# Patient Record
Sex: FEMALE | Race: WHITE | NOT HISPANIC OR LATINO | Employment: PART TIME | URBAN - METROPOLITAN AREA
[De-identification: names, ages, dates, MRNs, and addresses within clinical notes are randomized per-mention and may not be internally consistent; named-entity substitution may affect disease eponyms.]

---

## 2018-06-13 ENCOUNTER — OFFICE VISIT (OUTPATIENT)
Dept: NEUROSURGERY | Facility: CLINIC | Age: 28
End: 2018-06-13

## 2018-06-13 VITALS — OXYGEN SATURATION: 99 % | HEIGHT: 65 IN | RESPIRATION RATE: 18 BRPM | BODY MASS INDEX: 48.15 KG/M2 | WEIGHT: 289 LBS

## 2018-06-13 DIAGNOSIS — S39.012A STRAIN OF LUMBAR REGION, INITIAL ENCOUNTER: Primary | ICD-10-CM

## 2018-06-13 DIAGNOSIS — M51.36 DISC DEGENERATION, LUMBAR: ICD-10-CM

## 2018-06-13 DIAGNOSIS — Z71.6 ENCOUNTER FOR SMOKING CESSATION COUNSELING: ICD-10-CM

## 2018-06-13 DIAGNOSIS — M54.50 ACUTE BILATERAL LOW BACK PAIN WITHOUT SCIATICA: ICD-10-CM

## 2018-06-13 PROCEDURE — 99203 OFFICE O/P NEW LOW 30 MIN: CPT | Performed by: PHYSICIAN ASSISTANT

## 2018-06-13 PROCEDURE — 99406 BEHAV CHNG SMOKING 3-10 MIN: CPT | Performed by: PHYSICIAN ASSISTANT

## 2018-06-13 RX ORDER — BACLOFEN 10 MG/1
10 TABLET ORAL 2 TIMES DAILY
Refills: 0 | COMMUNITY
Start: 2018-04-04 | End: 2018-06-13

## 2018-06-13 RX ORDER — LORAZEPAM 1 MG/1
1 TABLET ORAL DAILY
Refills: 0 | COMMUNITY
Start: 2018-04-04

## 2018-06-13 RX ORDER — METHYLPREDNISOLONE 4 MG/1
TABLET ORAL
Refills: 0 | COMMUNITY
Start: 2018-06-01 | End: 2018-06-13

## 2018-06-13 RX ORDER — TRAMADOL HYDROCHLORIDE 50 MG/1
TABLET ORAL
Refills: 0 | COMMUNITY
Start: 2018-06-01 | End: 2019-01-31

## 2018-06-13 RX ORDER — PROMETHAZINE HYDROCHLORIDE 12.5 MG/1
TABLET ORAL
Refills: 0 | COMMUNITY
Start: 2018-03-15 | End: 2018-06-13

## 2018-06-13 RX ORDER — AMITRIPTYLINE HYDROCHLORIDE 50 MG/1
50 TABLET, FILM COATED ORAL
Refills: 0 | COMMUNITY
Start: 2018-06-05 | End: 2018-06-13

## 2018-06-13 RX ORDER — IBUPROFEN 800 MG/1
800 TABLET ORAL EVERY 6 HOURS
Refills: 0 | COMMUNITY
Start: 2018-03-15 | End: 2018-06-13

## 2018-06-13 RX ORDER — CODEINE PHOSPHATE AND GUAIFENESIN 10; 100 MG/5ML; MG/5ML
SOLUTION ORAL
Refills: 0 | COMMUNITY
Start: 2018-03-15 | End: 2018-06-13

## 2018-06-13 RX ORDER — MELOXICAM 7.5 MG/1
7.5 TABLET ORAL 2 TIMES DAILY
Qty: 60 TABLET | Refills: 0 | Status: SHIPPED | OUTPATIENT
Start: 2018-06-13 | End: 2018-07-23 | Stop reason: SDUPTHER

## 2018-06-13 RX ORDER — MELOXICAM 15 MG/1
15 TABLET ORAL DAILY
Refills: 0 | COMMUNITY
Start: 2018-06-01 | End: 2018-07-23 | Stop reason: SDUPTHER

## 2018-06-13 NOTE — PROGRESS NOTES
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  She fell from standing and landed on her back about 2 weeks ago.  She complains of pain in the low back. After walking for extended periods she has notices some discomfort down the anterior aspect of the right leg to the calf.  Largely her pain is axial and has improved slowly everyday since her fall. She She was evaluated in the ED after her fall and a CT was done which reportedly showed a \"bulging disc\".  She has a history of some intermittent low back pain.  No bowel or bladder issues. No weakness.     Past Medical History:   Diagnosis Date   • Arthritis    • Headache    • Low back pain        Past Surgical History:   Procedure Laterality Date   •  SECTION     • CHOLECYSTECTOMY  2015       Social History     Social History   • Marital status: Legally      Spouse name: N/A   • Number of children: N/A   • Years of education: N/A     Occupational History   • Not on file.     Social History Main Topics   • Smoking status: Current Every Day Smoker   • Smokeless tobacco: Never Used   • Alcohol use Yes   • Drug use: No   • Sexual activity: Defer     Other Topics Concern   • Not on file     Social History Narrative   • No narrative on file         Current Outpatient Prescriptions:   •  LORazepam (ATIVAN) 1 MG tablet, Take 1 mg by mouth Daily., Disp: , Rfl: 0  •  meloxicam (MOBIC) 15 MG tablet, Take 15 mg by mouth Daily., Disp: , Rfl: 0  •  meloxicam (MOBIC) 7.5 MG tablet, Take 1 tablet by mouth 2 (Two) Times a Day., Disp: 60 tablet, Rfl: 0  •  traMADol (ULTRAM) 50 MG tablet, TAKE 1 TABLET BY MOUTH 3 TO 4 TIMES A DAY AS NEEDED, Disp: , Rfl: 0    I discussed >3m the need to STOP smoking, there is a direct correlation between smoking and wound healing and degenerative disc " disease. Patient will take this under advisement and discuss with their primary provider.      Review of Systems   Constitutional: Negative for activity change, appetite change, chills, diaphoresis, fatigue, fever and unexpected weight change.   HENT: Negative for congestion, dental problem, drooling, ear discharge, ear pain, facial swelling, hearing loss, mouth sores, nosebleeds, postnasal drip, rhinorrhea, sinus pressure, sneezing, sore throat, tinnitus, trouble swallowing and voice change.    Eyes: Negative for photophobia, pain, discharge, redness, itching and visual disturbance.   Respiratory: Negative for apnea, cough, choking, chest tightness, shortness of breath, wheezing and stridor.    Cardiovascular: Negative for chest pain, palpitations and leg swelling.   Gastrointestinal: Negative for abdominal distention, abdominal pain, anal bleeding, blood in stool, constipation, diarrhea, nausea, rectal pain and vomiting.   Endocrine: Negative for cold intolerance, heat intolerance, polydipsia, polyphagia and polyuria.   Genitourinary: Negative for decreased urine volume, difficulty urinating, dysuria, enuresis, flank pain, frequency, genital sores, hematuria and urgency.   Musculoskeletal: Positive for back pain. Negative for arthralgias, gait problem, joint swelling, myalgias, neck pain and neck stiffness.   Skin: Negative for color change, pallor, rash and wound.   Allergic/Immunologic: Negative for environmental allergies, food allergies and immunocompromised state.   Neurological: Negative for dizziness, tremors, seizures, syncope, facial asymmetry, speech difficulty, weakness, light-headedness, numbness and headaches.   Hematological: Negative for adenopathy. Does not bruise/bleed easily.   Psychiatric/Behavioral: Negative for agitation, behavioral problems, confusion, decreased concentration, dysphoric mood, hallucinations, self-injury, sleep disturbance and suicidal ideas. The patient is not nervous/anxious  "and is not hyperactive.    All other systems reviewed and are negative.      Objective   Vital Signs: Resp. rate 18, height 165.1 cm (65\"), weight 131 kg (289 lb), SpO2 99 %.  Physical Exam   Constitutional: She appears well-developed and well-nourished. No distress.   HENT:   Head: Normocephalic and atraumatic.   Eyes: EOM are normal. Pupils are equal, round, and reactive to light.   Pulmonary/Chest: Effort normal and breath sounds normal.   Skin: Skin is warm and dry.   Psychiatric: She has a normal mood and affect. Her behavior is normal. Thought content normal.   Nursing note and vitals reviewed.  Musculoskeletal:  Strength is intact in upper and lower extremities to direct testing.  Station and gait are normal.  Straight leg raise elicits a stretching pain behind the knee.   Neurologic:  Muscle tone is normal throughout.  Coordination is intact.  Deep tendon reflexes: 1+ and symmetrical.  Sensation is intact to light touch throughout.  Patient is oriented to person, place, and time.    Independent review of radiographic imaging: Ct of the lumbar spine demonstrates a central disc bulge at L4-5.     Assessment/Plan    Diagnosis: lumbar strain     Medical Decision Making: I recommended physical therapy and continue Mobic. I also counseled her on weight loss and smoking cessation for her chronic intermittent low back issues.  She will follow up with me after her therapy in 6 weeks. If she has any issue in the interim I asked her to call our office.         Preeti was seen today for back pain.    Diagnoses and all orders for this visit:    Disc degeneration, lumbar  -     Ambulatory Referral to Physical Therapy Evaluate and treat    Strain of lumbar region, initial encounter  -     Ambulatory Referral to Physical Therapy Evaluate and treat    Acute bilateral low back pain without sciatica  -     Ambulatory Referral to Physical Therapy Evaluate and treat    Other orders  -     meloxicam (MOBIC) 7.5 MG tablet; Take 1 " tablet by mouth 2 (Two) Times a Day.                 Patient Care Team:  AMANUEL Israel as PCP - General (Nurse Practitioner)  AMANUEL Israel as Referring Physician (Nurse Practitioner)

## 2018-07-23 RX ORDER — MELOXICAM 7.5 MG/1
TABLET ORAL
Qty: 60 TABLET | Refills: 3 | Status: SHIPPED | OUTPATIENT
Start: 2018-07-23 | End: 2019-01-31

## 2018-10-01 ENCOUNTER — TELEPHONE (OUTPATIENT)
Dept: NEUROSURGERY | Facility: CLINIC | Age: 28
End: 2018-10-01

## 2018-10-01 DIAGNOSIS — G89.29 CHRONIC BILATERAL LOW BACK PAIN, WITH SCIATICA PRESENCE UNSPECIFIED: Primary | ICD-10-CM

## 2018-10-01 DIAGNOSIS — M54.5 CHRONIC BILATERAL LOW BACK PAIN, WITH SCIATICA PRESENCE UNSPECIFIED: Primary | ICD-10-CM

## 2018-10-01 PROBLEM — M54.50 CHRONIC BILATERAL LOW BACK PAIN: Status: ACTIVE | Noted: 2018-10-01

## 2018-10-01 NOTE — TELEPHONE ENCOUNTER
Provider: Haydee    Caller: self  Time of call: 11:06    Phone #: [phone]   Surgery: n/a    Last visit:  6/13/18   Next visit: after therapy    ANTONIA:         Reason for call:         Patient called to get a new PT order. It has been to long since she was seen and the PT place needs a new order.    I have pend the order.

## 2019-01-31 ENCOUNTER — OFFICE VISIT (OUTPATIENT)
Dept: NEUROSURGERY | Facility: CLINIC | Age: 29
End: 2019-01-31

## 2019-01-31 VITALS
HEIGHT: 65 IN | TEMPERATURE: 97.6 F | BODY MASS INDEX: 45.18 KG/M2 | SYSTOLIC BLOOD PRESSURE: 132 MMHG | WEIGHT: 271.2 LBS | DIASTOLIC BLOOD PRESSURE: 88 MMHG

## 2019-01-31 DIAGNOSIS — M54.41 CHRONIC BILATERAL LOW BACK PAIN WITH RIGHT-SIDED SCIATICA: Primary | ICD-10-CM

## 2019-01-31 DIAGNOSIS — G89.29 CHRONIC BILATERAL LOW BACK PAIN WITH RIGHT-SIDED SCIATICA: Primary | ICD-10-CM

## 2019-01-31 PROCEDURE — 99214 OFFICE O/P EST MOD 30 MIN: CPT | Performed by: PHYSICIAN ASSISTANT

## 2019-01-31 RX ORDER — TIZANIDINE 4 MG/1
4 TABLET ORAL 3 TIMES DAILY
Refills: 0 | COMMUNITY
Start: 2019-01-18

## 2019-01-31 RX ORDER — LISINOPRIL 20 MG/1
TABLET ORAL
COMMUNITY
Start: 2019-01-22

## 2019-01-31 RX ORDER — OXYCODONE HYDROCHLORIDE AND ACETAMINOPHEN 5; 325 MG/1; MG/1
TABLET ORAL
COMMUNITY
Start: 2019-01-29

## 2019-01-31 NOTE — PROGRESS NOTES
"Patient: Preeti Tejada  : 1990  Gender: female    Primary Care Provider: Sudha Mari APRN      Chief Complaint:   Chief Complaint   Patient presents with   • Follow-up     after physical therapy       History of Present Illness:  Mrs. Tejada is a 28-year-old female who works as a like  who presents today for a follow-up for low back pain. She was initially evaluated in our office in 2018 following an ED visit where CT demonstrated a \"bulging disc\". She has not had any additional imaging to date.  We referred her to physical therapy at this time and today she is following up due to ongoing pain.  Patient states that she has low back pain that is constant. She feels as if her back \"locks up\" when she tries to go from sitting to standing. This is also associated with right leg pain and weakness. She states that she walks with a limp due to this. She also admits to numbness that occurs intermittently in her entire right leg but spares the toes. She denies any bowel or bladder dysfunction in association. For several months she has attended PT regularly as well as going for chiropractic adjustments when needed, with only minimal relief.  She has also taken Mobic and multiple Medrol Dosepaks from her PCP, which give only short term relief.       Past Medical and Surgical History:  Past Medical History:   Diagnosis Date   • Arthritis    • Headache    • Low back pain      Past Surgical History:   Procedure Laterality Date   •  SECTION     • CHOLECYSTECTOMY  2015   • DILATATION AND CURETTAGE  2019    [place], [place]   • TUBAL ABDOMINAL LIGATION  2019    [facility]       Current Medications:    Current Outpatient Medications:   •  lisinopril (PRINIVIL,ZESTRIL) 20 MG tablet, , Disp: , Rfl:   •  LORazepam (ATIVAN) 1 MG tablet, Take 1 mg by mouth Daily., Disp: , Rfl: 0  •  norethindrone (AYGESTIN) 5 MG tablet, , Disp: , Rfl:   •  " "oxyCODONE-acetaminophen (PERCOCET) 5-325 MG per tablet, , Disp: , Rfl:   •  tiZANidine (ZANAFLEX) 4 MG tablet, Take 4 mg by mouth 3 (Three) Times a Day., Disp: , Rfl: 0    Allergies:  Allergies   Allergen Reactions   • Topamax [Topiramate] Shortness Of Breath and Rash         Review of Systems   HENT: Negative.    Eyes: Negative.    Respiratory: Negative.    Cardiovascular: Negative.    Gastrointestinal: Negative.    Endocrine: Negative.    Genitourinary: Negative.    Musculoskeletal: Positive for back pain.        RLE pain   Skin: Negative.    Allergic/Immunologic: Negative.    Neurological: Positive for weakness and numbness (RLE).   Hematological: Negative.    Psychiatric/Behavioral: Negative.    All other systems reviewed and are negative.        Physical Exam  NEUROLOGICAL:  Mental status:  Alert and oriented.  Speech intact.  Recent and remote memory intact.   Musculoskeletal:  No edema, atrophy or deformity noted  RLE dorsiflexion strength 4/5, 5/5 in remaining to direct testing  LLE strength 5/5  SLR positive on the right  Sensation:  Intact to touch upper and lower extremities.  Position sense intact.  Reflexes:  DTR 1+ right patella, 2+ achilles to direct testing  2+ in LLE  Gait: Antalgic, assisted by her     Vitals:    01/31/19 1038   BP: 132/88   Temp: 97.6 °F (36.4 °C)   TempSrc: Temporal   Weight: 123 kg (271 lb 3.2 oz)   Height: 165.1 cm (65\")         Imaging Review:  None    Assessment:  Chronic back pain, with worsening right lower extremity involement    Plan:  Mrs. Tejada was seen today in a follow up visit for low back pain. This pain has become progressively worse since we last saw her in June 2018. She has worsening RLE pain with new associated numbness of the entire right leg. She has attended physical therapy regularly since this visit, ultimately without improvement. She also denies improvement from chiropractic adjustments and medical management. Due to her worsening back pain and " new right leg symptoms which appear to be radicular in nature, I would like to send her for a lumbar MRI. She continues to use tobacco products and I have counseled her on the importance of smoking cessation to promote better healing and overall health.   She will follow up with me after MRI is completed. I have advised her to contact our office if she develops any worsening back pain, lower extremity weakness, bowel or bladder dysfunction in the interim.     Follow Up:  Approximately 2 weeks following MRI    Anay Montejo PA-C

## 2019-02-12 ENCOUNTER — OFFICE VISIT (OUTPATIENT)
Dept: NEUROSURGERY | Facility: CLINIC | Age: 29
End: 2019-02-12

## 2019-02-12 VITALS
TEMPERATURE: 97.4 F | HEIGHT: 65 IN | SYSTOLIC BLOOD PRESSURE: 168 MMHG | BODY MASS INDEX: 44.48 KG/M2 | WEIGHT: 267 LBS | DIASTOLIC BLOOD PRESSURE: 84 MMHG

## 2019-02-12 DIAGNOSIS — G89.29 CHRONIC BILATERAL LOW BACK PAIN WITH RIGHT-SIDED SCIATICA: ICD-10-CM

## 2019-02-12 DIAGNOSIS — M54.41 CHRONIC BILATERAL LOW BACK PAIN WITH RIGHT-SIDED SCIATICA: ICD-10-CM

## 2019-02-12 DIAGNOSIS — M51.26 HERNIATION OF LUMBAR INTERVERTEBRAL DISC: Primary | ICD-10-CM

## 2019-02-12 PROCEDURE — 99213 OFFICE O/P EST LOW 20 MIN: CPT | Performed by: PHYSICIAN ASSISTANT

## 2019-02-12 RX ORDER — NABUMETONE 750 MG/1
750 TABLET, FILM COATED ORAL 2 TIMES DAILY
Qty: 60 TABLET | Refills: 0 | Status: SHIPPED | OUTPATIENT
Start: 2019-02-12 | End: 2019-03-14 | Stop reason: SDUPTHER

## 2019-02-12 NOTE — PROGRESS NOTES
Patient: Preeti Tejada  : 1990  Gender: female    Primary Care Provider: Sudha Mari APRN      Chief Complaint:   Chief Complaint   Patient presents with   • Follow-up     back pain       History of Present Illness:  Mrs. White is a 28-year-old female who presents today for follow-up of low back pain with diagnostic imaging.  She has had ongoing low back pain with associated right leg pain and weakness for approximately 8 months.  She has been attending physical therapy regularly as well as taking anti-inflammatory medications and steroids which have given little relief.  Today, she states that the pain is not improved.  She has recently started experiencing tingling of her anterior left thigh.  She states that her right leg pain is unbearable making it difficult for her to ambulate.  She denies weakness of either lower extremity, bowel or bladder dysfunction.    Past Medical and Surgical History:  Past Medical History:   Diagnosis Date   • Arthritis    • Headache    • Low back pain      Past Surgical History:   Procedure Laterality Date   •  SECTION     • CHOLECYSTECTOMY  2015   • DILATATION AND CURETTAGE  2019    [place], [place]   • TUBAL ABDOMINAL LIGATION  2019    [facility]       Current Medications:    Current Outpatient Medications:   •  lisinopril (PRINIVIL,ZESTRIL) 20 MG tablet, , Disp: , Rfl:   •  LORazepam (ATIVAN) 1 MG tablet, Take 1 mg by mouth Daily., Disp: , Rfl: 0  •  nabumetone (RELAFEN) 750 MG tablet, Take 1 tablet by mouth 2 (Two) Times a Day., Disp: 60 tablet, Rfl: 0  •  norethindrone (AYGESTIN) 5 MG tablet, , Disp: , Rfl:   •  oxyCODONE-acetaminophen (PERCOCET) 5-325 MG per tablet, , Disp: , Rfl:   •  tiZANidine (ZANAFLEX) 4 MG tablet, Take 4 mg by mouth 3 (Three) Times a Day., Disp: , Rfl: 0    Allergies:  Allergies   Allergen Reactions   • Topamax [Topiramate] Shortness Of Breath and Rash         Review of Systems  "  Musculoskeletal: Positive for back pain.   All other systems reviewed and are negative.        Physical Exam  Mental status:  Alert and oriented.  Speech intact.  Recent and remote memory intact.   Musculoskeletal:  No edema, atrophy or deformity noted  RLE dorsiflexion strength 4/5, 5/5 in plantar flexion and knee extension  LLE dorsiflexion 4/5, 5/5 in plantarflexion and knee extension  SLR positive on the right at 30°, positive on the left at 45°  Sensation:  Intact to touch upper and lower extremities.  Position sense intact.  Reflexes:  DTR 1+ throughout lower extremities to direct testing  Gait: Antalgic    Vitals:    02/12/19 1344   BP: 168/84   Temp: 97.4 °F (36.3 °C)   TempSrc: Temporal   Weight: 121 kg (267 lb)   Height: 165.1 cm (65\")         Imaging Review:  Lumbar MRI: Disc herniation at L4-5 left    Assessment/Plan:  Mrs. White was seen today in the follow-up visit for low back pain.  She has returned today with lumbar MRI scans. Lumbar MRI reveals a herniated lumbar disc at L4-5 on the left, likely compressing the L4 nerve root. Due to radicular symptoms on the right, I would like to have patient return to see Dr. Oconnor or me on a day that he is on office to determine if surgical intervention is warranted.  I have discussed with patient the risk of poor healing due to her comorbidities such as tobacco use and high BMI. I have outlined several conservative options for her, such as BELLA, which she refuses. Additionally, she is not interested in continuing physical therapy at the current juncture. I have given her a prescription for Relafen. I have also informed her that this pain will likely improve with time without any intervention. We will see her back at the next available appointment in which Dr. Oconnor will be available in [place]. She was instructed to contact our office if she develops increased back pain, lower extremity weakness, saddle anaesthesia, bowel or bladder dysfunction.       Anay" BINDU Montejo PA-C

## 2019-02-14 ENCOUNTER — OFFICE VISIT (OUTPATIENT)
Dept: NEUROSURGERY | Facility: CLINIC | Age: 29
End: 2019-02-14

## 2019-02-14 VITALS
DIASTOLIC BLOOD PRESSURE: 72 MMHG | HEIGHT: 65 IN | WEIGHT: 271 LBS | BODY MASS INDEX: 45.15 KG/M2 | SYSTOLIC BLOOD PRESSURE: 118 MMHG | TEMPERATURE: 97 F

## 2019-02-14 DIAGNOSIS — M51.26 HERNIATION OF LUMBAR INTERVERTEBRAL DISC: Primary | ICD-10-CM

## 2019-02-14 PROCEDURE — 99213 OFFICE O/P EST LOW 20 MIN: CPT | Performed by: PHYSICIAN ASSISTANT

## 2019-02-14 RX ORDER — METHOCARBAMOL 750 MG/1
750 TABLET, FILM COATED ORAL 3 TIMES DAILY
Qty: 90 TABLET | Refills: 1 | Status: SHIPPED | OUTPATIENT
Start: 2019-02-14

## 2019-02-14 NOTE — PROGRESS NOTES
Patient: Preeti Teajda  : 1990  Gender: female    Primary Care Provider: Sudha Mari APRN      Chief Complaint:   Chief Complaint   Patient presents with   • Follow-up       History of Present Illness:  Mrs. White is a 28-year-old female who presents today for a follow up visit to discuss surgical planning. She continues to have back and right leg radicular pain. This pain is not improving with steroids, NSAIDS, or current PT regimen. She was advised to return to her office after her therapist felt she was not improving.  I requested that she return today for further discussion.      Past Medical and Surgical History:  Past Medical History:   Diagnosis Date   • Arthritis    • Headache    • Low back pain      Past Surgical History:   Procedure Laterality Date   •  SECTION     • CHOLECYSTECTOMY  2015   • DILATATION AND CURETTAGE  2019    [place], [place]   • TUBAL ABDOMINAL LIGATION  2019    [facility]       Current Medications:    Current Outpatient Medications:   •  lisinopril (PRINIVIL,ZESTRIL) 20 MG tablet, , Disp: , Rfl:   •  LORazepam (ATIVAN) 1 MG tablet, Take 1 mg by mouth Daily., Disp: , Rfl: 0  •  methocarbamol (ROBAXIN) 750 MG tablet, Take 1 tablet by mouth 3 (Three) Times a Day., Disp: 90 tablet, Rfl: 1  •  nabumetone (RELAFEN) 750 MG tablet, Take 1 tablet by mouth 2 (Two) Times a Day., Disp: 60 tablet, Rfl: 0  •  norethindrone (AYGESTIN) 5 MG tablet, , Disp: , Rfl:   •  oxyCODONE-acetaminophen (PERCOCET) 5-325 MG per tablet, , Disp: , Rfl:   •  tiZANidine (ZANAFLEX) 4 MG tablet, Take 4 mg by mouth 3 (Three) Times a Day., Disp: , Rfl: 0    Allergies:  Allergies   Allergen Reactions   • Topamax [Topiramate] Shortness Of Breath and Rash         Review of Systems   Musculoskeletal: Positive for back pain.   All other systems reviewed and are negative.        Physical Exam    Mental status:  Alert and oriented.  Speech intact.  Recent and  "remote memory intact.   Musculoskeletal:  No edema, atrophy or deformity noted  RLE dorsiflexion strength 4/5, 5/5 in plantar flexion and knee extension  LLE dorsiflexion 4/5, 5/5 in plantarflexion and knee extension  SLR positive on the right at 30°, positive on the left at 45°  Sensation:  Intact to touch upper and lower extremities.  Position sense intact.  Reflexes:  DTR 1+ throughout lower extremities to direct testing  Gait: Antalgic to the right        Vitals:    02/14/19 1040   BP: 118/72   Temp: 97 °F (36.1 °C)   TempSrc: Temporal   Weight: 123 kg (271 lb)   Height: 165.1 cm (65\")         Assessment/Plan:  Mrs. White diagnostic imaging and physical exam discussed with Dr. Oconnor. She has a very clear disc herniation at L4-5 on the left that would likely cause radicular symptoms however, she localizes all pain to her right leg. Due to her left side being asymptomatic, Dr. Oconnor advised against surgery at this point. We would like for her to continue physical therapy and conservative measures, and have advised her that the RLE symptoms will likely resolve with time. I also gave her the option for a referral for BELLA, which she refuses at this time. I have given her a script for a new muscle relaxer as well. She will contact our office if she has any new or worsening symptoms, develops severe weakness/bowel or bladder dysfunction. Additionally she was instructed to contact us with any concerns she may have.    Follow Up:  CHALINO Montejo PA-C  "

## 2019-03-06 ENCOUNTER — TELEPHONE (OUTPATIENT)
Dept: NEUROSURGERY | Facility: CLINIC | Age: 29
End: 2019-03-06

## 2019-03-06 DIAGNOSIS — M51.26 HERNIATION OF LUMBAR INTERVERTEBRAL DISC: Primary | ICD-10-CM

## 2019-03-06 NOTE — TELEPHONE ENCOUNTER
Spoke with pt, pt states she will NOT have an injection, and she has told both Hailey & Dr. Oconnor this. I advised pt this is unfortunately all we have to offer her at this point.     I advised pt that she doesn't have to have an injection if she chooses not to, I explained to pt that she can atleast get established with a pain management doctor and discuss other options.     Pt would like to see someone close to the [place] area, any suggestions?

## 2019-03-06 NOTE — TELEPHONE ENCOUNTER
Provider: Elvin  Caller:  Preeti  Time of call:   236p  Phone #:  [phone]  Surgery:  n/a  Surgery Date: n/a    Last visit:  2/14/19   Next visit: TBD    Reason for call:       Pt left a message stating the muscle relaxer (Robaxin) that was prescribed to her at her last visit is doing nothing.     Spoke w/ pt. Pt is having bilateral leg pain, R side is worse. She c/o of some shooting pain in her L leg also.     No B/B issues.  No saddle numbness    Pt has had multiple chiropractor sessions, she has used heat and ice, and attended her first PT session today.    Please advise?

## 2019-03-12 NOTE — TELEPHONE ENCOUNTER
Spoke w/ pt.  She wanted to let us know that her insurance will not pay for physical therapy because she seeing a chiropractor, she would rather do this than PT.    Pt has not heard from our office in regard to pain management appt.     I apologized to pt and let her know I will ask one of our schedulers to help with this situation.     Routing message to Caleb.

## 2019-03-14 DIAGNOSIS — M51.26 HERNIATION OF LUMBAR INTERVERTEBRAL DISC: Primary | ICD-10-CM

## 2019-03-14 RX ORDER — NABUMETONE 750 MG/1
TABLET, FILM COATED ORAL
Qty: 60 TABLET | Refills: 0 | Status: SHIPPED | OUTPATIENT
Start: 2019-03-14

## 2019-03-14 NOTE — TELEPHONE ENCOUNTER
Provider: Elvin  Caller: self  Time of call: n/a  Phone #:  n/a  Last visit:  2/14/19   Next visit: none    Reason for call:         Automated refill request.

## 2020-02-07 RX ORDER — SERTRALINE HYDROCHLORIDE 25 MG/1
TABLET, FILM COATED ORAL
Qty: 30 TABLET | Refills: 0 | OUTPATIENT
Start: 2020-02-07